# Patient Record
Sex: FEMALE | Race: WHITE | Employment: PART TIME
[De-identification: names, ages, dates, MRNs, and addresses within clinical notes are randomized per-mention and may not be internally consistent; named-entity substitution may affect disease eponyms.]

---

## 2022-03-18 PROBLEM — Z87.898 HISTORY OF MALIGNANT HYPERTHERMIA: Status: ACTIVE | Noted: 2017-12-27

## 2024-11-18 ENCOUNTER — HOSPITAL ENCOUNTER (OUTPATIENT)
Facility: HOSPITAL | Age: 44
Discharge: HOME | End: 2024-11-21
Attending: ORTHOPAEDIC SURGERY
Payer: MEDICAID

## 2024-11-18 DIAGNOSIS — M25.511 RIGHT SHOULDER PAIN, UNSPECIFIED CHRONICITY: ICD-10-CM

## 2024-11-18 PROCEDURE — 73222 MRI JOINT UPR EXTREM W/DYE: CPT

## 2024-11-18 PROCEDURE — 77002 NEEDLE LOCALIZATION BY XRAY: CPT

## 2024-11-18 PROCEDURE — A9577 INJ MULTIHANCE: HCPCS | Performed by: ORTHOPAEDIC SURGERY

## 2024-11-18 PROCEDURE — 6360000004 HC RX CONTRAST MEDICATION: Performed by: ORTHOPAEDIC SURGERY

## 2024-11-18 PROCEDURE — 2500000003 HC RX 250 WO HCPCS: Performed by: ORTHOPAEDIC SURGERY

## 2024-11-18 RX ORDER — IOPAMIDOL 612 MG/ML
50 INJECTION, SOLUTION INTRAVASCULAR
Status: COMPLETED | OUTPATIENT
Start: 2024-11-18 | End: 2024-11-18

## 2024-11-18 RX ORDER — LIDOCAINE HYDROCHLORIDE 10 MG/ML
10 INJECTION, SOLUTION EPIDURAL; INFILTRATION; INTRACAUDAL; PERINEURAL ONCE
Status: COMPLETED | OUTPATIENT
Start: 2024-11-18 | End: 2024-11-18

## 2024-11-18 RX ADMIN — LIDOCAINE HYDROCHLORIDE 10 ML: 10 INJECTION, SOLUTION EPIDURAL; INFILTRATION; INTRACAUDAL; PERINEURAL at 10:43

## 2024-11-18 RX ADMIN — GADOBENATE DIMEGLUMINE 5 ML: 529 INJECTION, SOLUTION INTRAVENOUS at 10:44

## 2024-11-18 RX ADMIN — IOPAMIDOL 50 ML: 612 INJECTION, SOLUTION INTRAVENOUS at 10:43

## 2025-03-21 ENCOUNTER — HOSPITAL ENCOUNTER (OUTPATIENT)
Facility: HOSPITAL | Age: 45
Setting detail: RECURRING SERIES
Discharge: HOME | End: 2025-03-24
Payer: MEDICAID

## 2025-03-21 PROCEDURE — 97161 PT EVAL LOW COMPLEX 20 MIN: CPT

## 2025-03-21 NOTE — PROGRESS NOTES
[facility]  [address] [place] [address]  Ph [phone]  Fx [phone]    Plan of Care/ Statement of Necessity for Physical Therapy Services      Patient name: Siomara Pat Start of Care: 3/21/2025   Referral source: Joselin Larose,* : 1980    Medical Diagnosis: Primary osteoarthritis, right shoulder [M19.011]  Right shoulder pain [M25.511]   Onset Date:25    Treatment Diagnosis: M25.511  RIGHT SHOULDER PAIN     Prior Hospitalization: see medical history Provider#: [id number]     Comorbidities: Other: alcohol use, arthritis, virgilio-danlos, tobacco use, bone spur (collar bone), R RCR, R tendon repair     Prior Level of Function: functionally independent, no AD, active lifestyle       The Plan of Care and following information is based on the information from the initial evaluation.  Assessment/ key information: 45 yo female who [facility] to [facility] with c/o Shoulder pain. Patient is s/p R total shoulder replacement on 25. Patient reports that she has virgilio-danlos and doesn't think that mobility will be an issue. She states that her pain ranges from 0/10 - 10/10 and eases with ice and worsens with movement. Patient displays weakness with L shoulder flex/ER (R not tested due to time since surgery and protocol. Her AROM is WFL on the L and she is progressing on the R. Patient is a  and currently unable to work due to her shoulder; she will benefit from skilled therapy to increase strength and ROM in order to meet goals, decrease pain and return to PLOF. Patient demonstrates decreased ROM, decreased strength, impaired posture, pain and decreased functional mobility tolerance.   Evaluation Complexity HistoryHIGH Complexity :3+ comorbidities / personal factors will impact the outcome/ POC  ; Examination HIGH Complexity : 4+ Standardized tests and measures addressing body structure, function, activity limitation and / or participation in 
x weekly - 2 sets - 10 reps  - Seated Gripping Towel  - 1 x daily - 7 x weekly - 2 sets - 10 reps - 2-3\" hold  - Supported Elbow Flexion Extension AROM  - 1 x daily - 7 x weekly - 2 sets - 10 reps  - Seated Scapular Retraction  - 1 x daily - 7 x weekly - 1 sets - 10 reps - 10\" hold  - Seated Forearm Pronation Supination AROM  - 1 x daily - 7 x weekly - 2 sets - 10 reps  - Supine Shoulder Flexion AAROM  - 1 x daily - 7 x weekly - 1 sets - 10 reps - 10\" hold  [] Other detail:       General Evaluation    ROM:                             AROM   PROM   Shoulder Left Right Right   Flexion WNL NT 97   Extension NT NT NT   ABD WNL NT 60   ER T3 NT NT   IR T3 NT NT       Strength (MMT):  Shoulder Left (1-5) Right (1-5)   Shoulder Flexion 4+ NT   Shoulder Ext NT NT   Shoulder ABD 4 NT   Shoulder ADD NT NT   Shoulder IR 5 NT   Shoulder ER 3+ NT                                             Pain Level (0-10 scale) post treatment: 0/10    ASSESSMENT/Changes in Function: 45 yo female who [facility] to [facility] with c/o Shoulder pain. Patient is s/p R total shoulder replacement on 2/21/25. Patient reports that she has virgilio-danlos and doesn't think that mobility will be an issue. She states that her pain ranges from 0/10 - 10/10 and eases with ice and worsens with movement. Patient displays weakness with L shoulder flex/ER (R not tested due to time since surgery and protocol. Her AROM is WFL on the L and she is progressing on the R. Patient is a  and currently unable to work due to her shoulder; she will benefit from skilled therapy to increase strength and ROM in order to meet goals, decrease pain and return to PLOF. Patient demonstrates decreased ROM, decreased strength, impaired posture, pain and decreased functional mobility tolerance.    Patient will continue to benefit from skilled PT services to modify and progress therapeutic interventions, address functional mobility deficits, address ROM deficits, address

## 2025-03-25 ENCOUNTER — HOSPITAL ENCOUNTER (OUTPATIENT)
Facility: HOSPITAL | Age: 45
Setting detail: RECURRING SERIES
Discharge: HOME | End: 2025-03-28
Payer: MEDICAID

## 2025-03-25 PROCEDURE — 97110 THERAPEUTIC EXERCISES: CPT

## 2025-03-25 PROCEDURE — 97530 THERAPEUTIC ACTIVITIES: CPT

## 2025-03-25 NOTE — PROGRESS NOTES
PHYSICAL / OCCUPATIONAL THERAPY - DAILY TREATMENT NOTE     Patient Name: Siomara Pat    Date: 3/25/2025    : 1980  Insurance: Payor: [facility] MEDICAID / Plan: GOKUL [facility] HE[employer]KEEPERS PLUS / Product Type: *No Product type* /      Patient  verified Yes     Visit #   Current / Total 2 8   Time   In / Out 9:55 10:33   Pain   In / Out 1-2 2   Subjective Functional Status/Changes: Patient states this is her 7th right shoulder surgery, but first replacement so she's trying to get used to how this one feels compared to her other surgeries.   Changes to:  Allergies, Med Hx, Sx Hx?   no       TREATMENT AREA =  Primary osteoarthritis, right shoulder [M19.011]  Right shoulder pain [M25.511]    If an interpreting service is utilized for treatment of this patient, the contents of this document represent the material reviewed with the patient via the .     OBJECTIVE    Modalities Rationale:     offered but patient declined stating she doesn't like the vaso machine and she would ice at home    Therapeutic Procedures:  Tx Min Billable or 1:1 Min (if diff from Tx Min) Procedure, Rationale, Specifics   22  51146 Therapeutic Exercise (timed):  increase ROM, strength, coordination, balance, and proprioception to improve patient's ability to progress to PLOF and address remaining functional goals. (see flow sheet as applicable)    Details if applicable:       8  82068 Therapeutic Activity (timed):  use of dynamic activities replicating functional movements to increase ROM, strength, coordination, balance, and proprioception in order to improve patient's ability to progress to PLOF and address remaining functional goals.  (see flow sheet as applicable)   8  48262 Self Care/Home Management (timed):  improve patient knowledge and understanding of home injury/symptom/pain management, positioning, posture/ergonomics, and education regarding allowing right UE to swing during ambulation to promote increased ROM

## 2025-03-26 ENCOUNTER — TELEPHONE (OUTPATIENT)
Facility: HOSPITAL | Age: 45
End: 2025-03-26

## 2025-03-27 ENCOUNTER — HOSPITAL ENCOUNTER (OUTPATIENT)
Facility: HOSPITAL | Age: 45
Setting detail: RECURRING SERIES
Discharge: HOME | End: 2025-03-30
Payer: MEDICAID

## 2025-03-27 PROCEDURE — 97110 THERAPEUTIC EXERCISES: CPT

## 2025-03-27 PROCEDURE — 97530 THERAPEUTIC ACTIVITIES: CPT

## 2025-03-27 PROCEDURE — 97112 NEUROMUSCULAR REEDUCATION: CPT

## 2025-03-27 NOTE — PROGRESS NOTES
PHYSICAL / OCCUPATIONAL THERAPY - DAILY TREATMENT NOTE     Patient Name: Siomara Pat    Date: 3/27/2025    : 1980  Insurance: Payor: [facility] MEDICAID / Plan: [employer]OKUL [facility] HE[employer]KEEPERS PLUS / Product Type: *No Product type* /      Patient  verified Yes     Visit #   Current / Total 3 8   Time   In / Out 9:50 10:34   Pain   In / Out 3/10 2-3/10   Subjective Functional Status/Changes: Pt states she is extra stiff today   Changes to:  Allergies, Med Hx, Sx Hx?   no       TREATMENT AREA =  Primary osteoarthritis, right shoulder [M19.011]  Right shoulder pain [M25.511]    If an interpreting service is utilized for treatment of this patient, the contents of this document represent the material reviewed with the patient via the .     OBJECTIVE        Therapeutic Procedures:  Tx Min Billable or 1:1 Min (if diff from Tx Min) Procedure, Rationale, Specifics   20  98327 Therapeutic Exercise (timed):  increase ROM, strength, coordination, balance, and proprioception to improve patient's ability to progress to PLOF and address remaining functional goals. (see flow sheet as applicable)    Details if applicable:       10  78473 Neuromuscular Re-Education (timed):  improve balance, coordination, kinesthetic sense, posture, core stability and proprioception to improve patient's ability to develop conscious control of individual muscles and awareness of position of extremities in order to progress to PLOF and address remaining functional goals. (see flow sheet as applicable)    Details if applicable:     14  78013 Therapeutic Activity (timed):  use of dynamic activities replicating functional movements to increase ROM, strength, coordination, balance, and proprioception in order to improve patient's ability to progress to PLOF and address remaining functional goals.  (see flow sheet as applicable)     Details if applicable:           Details if applicable:            Details if applicable:     44

## 2025-04-01 ENCOUNTER — HOSPITAL ENCOUNTER (OUTPATIENT)
Facility: HOSPITAL | Age: 45
Setting detail: RECURRING SERIES
Discharge: HOME | End: 2025-04-04
Payer: MEDICAID

## 2025-04-01 PROCEDURE — 97112 NEUROMUSCULAR REEDUCATION: CPT

## 2025-04-01 PROCEDURE — 97110 THERAPEUTIC EXERCISES: CPT

## 2025-04-01 PROCEDURE — 97530 THERAPEUTIC ACTIVITIES: CPT

## 2025-04-01 NOTE — PROGRESS NOTES
PHYSICAL / OCCUPATIONAL THERAPY - DAILY TREATMENT NOTE     Patient Name: Siomara Pat    Date: 2025    : 1980  Insurance: Payor: [facility] MEDICAID / Plan: [employer]OKUL [facility] HE[employer]KEEPERS PLUS / Product Type: *No Product type* /      Patient  verified Yes     Visit #   Current / Total 4 8   Time   In / Out 1:51 2:38   Pain   In / Out 3/10 2/10   Subjective Functional Status/Changes: Pt states she slept on her arm last night, causing increased pain.    Changes to:  Allergies, Med Hx, Sx Hx?   no       TREATMENT AREA =  Primary osteoarthritis, right shoulder [M19.011]  Right shoulder pain [M25.511]    If an interpreting service is utilized for treatment of this patient, the contents of this document represent the material reviewed with the patient via the .     OBJECTIVE        Therapeutic Procedures:  Tx Min Billable or 1:1 Min (if diff from Tx Min) Procedure, Rationale, Specifics   20  56320 Therapeutic Exercise (timed):  increase ROM, strength, coordination, balance, and proprioception to improve patient's ability to progress to PLOF and address remaining functional goals. (see flow sheet as applicable)    Details if applicable:       10  92635 Neuromuscular Re-Education (timed):  improve balance, coordination, kinesthetic sense, posture, core stability and proprioception to improve patient's ability to develop conscious control of individual muscles and awareness of position of extremities in order to progress to PLOF and address remaining functional goals. (see flow sheet as applicable)    Details if applicable:     17  79396 Therapeutic Activity (timed):  use of dynamic activities replicating functional movements to increase ROM, strength, coordination, balance, and proprioception in order to improve patient's ability to progress to PLOF and address remaining functional goals.  (see flow sheet as applicable)     Details if applicable:           Details if applicable:

## 2025-04-03 ENCOUNTER — HOSPITAL ENCOUNTER (OUTPATIENT)
Facility: HOSPITAL | Age: 45
Setting detail: RECURRING SERIES
Discharge: HOME | End: 2025-04-06
Payer: MEDICAID

## 2025-04-03 PROCEDURE — 97110 THERAPEUTIC EXERCISES: CPT

## 2025-04-03 PROCEDURE — 97535 SELF CARE MNGMENT TRAINING: CPT

## 2025-04-03 PROCEDURE — 97112 NEUROMUSCULAR REEDUCATION: CPT

## 2025-04-03 NOTE — PROGRESS NOTES
Care/Home Management (timed):  improve patient knowledge and understanding of home injury/symptom/pain management, positioning, and posture/ergonomics  to improve patient's ability to progress to PLOF and address remaining functional goals.  (see flow sheet as applicable)    Details if applicable:            Details if applicable:     42  [facility] Totals Reminder: bill using total billable min of TIMED therapeutic procedures (example: do not include dry needle or estim unattended, both untimed codes, in totals to left)  8-22 min = 1 unit; 23-37 min = 2 units; 38-52 min = 3 units; 53-67 min = 4 units; 68-82 min = 5 units   Total Total     TOTAL TREATMENT TIME:        42     Charge Capture    [x]  Patient Education billed concurrently with other procedures   [x] Review HEP    [] Progressed/Changed HEP, detail:    [] Other detail:       Objective Information/Functional Measures/Assessment    Pt arrived in clinic, reporting minor pain in the Right shoulder at this time. Pt continues to be limited with therex progression due to post surgical protocol but, pt does have 6 week follow-up with surgeon tomorrow and will be able to progress next visit. Pt continues to tolerate PROM stretching and basic forearm, wrist and hand therex with reports of mild fatigue but, not above tolerance. Pt was able to achieve 120 deg of PROM flexion and scaption at this time, currently limited to this range per surgeon's provided protocol. Pt declined modalities post tx. Pt will benefit from continued therapy to address unmet goals and to return to prior level of activity.       Patient will continue to benefit from skilled PT / OT services to modify and progress therapeutic interventions, analyze and address functional mobility deficits, analyze and address ROM deficits, analyze and address strength deficits, analyze and address soft tissue restrictions, and analyze and cue for proper movement patterns to address functional deficits and attain

## 2025-04-08 ENCOUNTER — HOSPITAL ENCOUNTER (OUTPATIENT)
Facility: HOSPITAL | Age: 45
Setting detail: RECURRING SERIES
Discharge: HOME | End: 2025-04-11
Payer: MEDICAID

## 2025-04-08 PROCEDURE — 97110 THERAPEUTIC EXERCISES: CPT

## 2025-04-08 PROCEDURE — 97530 THERAPEUTIC ACTIVITIES: CPT

## 2025-04-08 PROCEDURE — 97112 NEUROMUSCULAR REEDUCATION: CPT

## 2025-04-08 NOTE — PROGRESS NOTES
to return to goals of returning to work.  Eval Status: Strength (MMT):  Shoulder Left (1-5) Right (1-5)   Shoulder Flexion 4+ NT   Shoulder Ext NT NT   Shoulder ABD 4 NT   Shoulder ADD NT NT   Shoulder IR 5 NT   Shoulder ER 3+ NT   Current: Initiated AROM and AAROM exercises today as patient stated she was cleared by surgeon to start strengthening.  Patient was challenged with voiding recruitment her right upper trap anytime her right UE approached 80-90 degrees; however, patient is self-aware of this and is trying to avoid the compensation.  Right UE fatigue noted after 1x10 reps each of 3 way shoulder, so had her perform pendulums and bicep curls (meaning activities that are not overhead) in between the first and second set of 3 way shoulder.   4/8/2025, in progress      Patient will improve pain in R shoulder to 2/10 at worst to improve functional activity tolerance and restore prior level of function.  Eval Status: 10/10 at worst    PLAN  Yes  Continue plan of care  []  Upgrade activities as tolerated  []  Discharge due to :  []  Other:    Vero Soriano, CIELO    4/8/2025    9:13 AM    Future Appointments   Date Time Provider Department Center   4/8/2025  9:50 AM Vero Soriano, PT [facility]   4/10/2025 10:30 AM Vero Soriano, PT [facility]   4/14/2025 10:30 AM Vero Soriano, PT [facility]   4/16/2025 10:30 AM Neli Larsen PT [facility]

## 2025-04-10 ENCOUNTER — HOSPITAL ENCOUNTER (OUTPATIENT)
Facility: HOSPITAL | Age: 45
Setting detail: RECURRING SERIES
Discharge: HOME | End: 2025-04-13
Payer: MEDICAID

## 2025-04-10 PROCEDURE — 97112 NEUROMUSCULAR REEDUCATION: CPT

## 2025-04-10 PROCEDURE — 97530 THERAPEUTIC ACTIVITIES: CPT

## 2025-04-10 NOTE — PROGRESS NOTES
returning to work.  Eval Status: Strength (MMT):  Shoulder Left (1-5) Right (1-5)   Shoulder Flexion 4+ NT   Shoulder Ext NT NT   Shoulder ABD 4 NT   Shoulder ADD NT NT   Shoulder IR 5 NT   Shoulder ER 3+ NT   Current: Initiated AROM and AAROM exercises today as patient stated she was cleared by surgeon to start strengthening.  Patient was challenged with voiding recruitment her right upper trap anytime her right UE approached 80-90 degrees; however, patient is self-aware of this and is trying to avoid the compensation.  Right UE fatigue noted after 1x10 reps each of 3 way shoulder, so had her perform pendulums and bicep curls (meaning activities that are not overhead) in between the first and second set of 3 way shoulder.   4/8/2025, in progress      Patient will improve pain in R shoulder to 2/10 at worst to improve functional activity tolerance and restore prior level of function.  Eval Status: 10/10 at worst    PLAN  Yes  Continue plan of care  []  Upgrade activities as tolerated  []  Discharge due to :  []  Other:    Vero Soriano, PT    4/10/2025    9:13 AM    Future Appointments   Date Time Provider Department Center   4/14/2025 10:30 AM Vero Soriano, PT [facility]   4/16/2025 10:30 AM Neli Larsen, PT [facility]

## 2025-04-14 ENCOUNTER — HOSPITAL ENCOUNTER (OUTPATIENT)
Facility: HOSPITAL | Age: 45
Setting detail: RECURRING SERIES
Discharge: HOME | End: 2025-04-17
Payer: MEDICAID

## 2025-04-14 PROCEDURE — 97535 SELF CARE MNGMENT TRAINING: CPT

## 2025-04-14 PROCEDURE — 97110 THERAPEUTIC EXERCISES: CPT

## 2025-04-14 PROCEDURE — 97530 THERAPEUTIC ACTIVITIES: CPT

## 2025-04-14 PROCEDURE — 97112 NEUROMUSCULAR REEDUCATION: CPT

## 2025-04-14 NOTE — PROGRESS NOTES
PHYSICAL / OCCUPATIONAL THERAPY - DAILY TREATMENT NOTE     Patient Name: Siomara Pat    Date: 2025    : 1980  Insurance: Payor: [facility] MEDICAID / Plan: [employer]OKUL [facility] HEALTHKEEPERS PLUS / Product Type: *No Product type* /      Patient  verified Yes     Visit #   Current / Total 8 8   Time   In / Out 10:30 10:27   Pain   In / Out 0 0/10 \"workout soreness\"   Subjective Functional Status/Changes: Patient states she is very minimally using/moving her right UE at home because she doesn't want to cause pain.  This includes patient not doing any cleaning (even if at waist height), \"a little\" cleaning using the right arm, and \"a little\" cooking.   Changes to:  Allergies, Med Hx, Sx Hx?   no       TREATMENT AREA =  Primary osteoarthritis, right shoulder [M19.011]  Right shoulder pain [M25.511]    If an interpreting service is utilized for treatment of this patient, the contents of this document represent the material reviewed with the patient via the .     OBJECTIVE    Modalities Rationale:     patient declined    Therapeutic Procedures:  Tx Min Billable or 1:1 Min (if diff from Tx Min) Procedure, Rationale, Specifics   14  71563 Therapeutic Exercise (timed):  increase ROM, strength, coordination, balance, and proprioception to improve patient's ability to progress to PLOF and address remaining functional goals. (see flow sheet as applicable)    Details if applicable:       27  86652 Therapeutic Activity (timed):  use of dynamic activities replicating functional movements to increase ROM, strength, coordination, balance, and proprioception in order to improve patient's ability to progress to PLOF and address remaining functional goals.  (see flow sheet as applicable)    Details if applicable:     8  86699 Neuromuscular Re-Education (timed):  improve balance, coordination, kinesthetic sense, posture, core stability and proprioception to improve patient's ability to develop conscious control

## 2025-04-14 NOTE — PROGRESS NOTES
[facility]  [address] [place] [address]  Ph [phone]  Fx [phone]      Continued Plan of Care/ Re-certification for Physical Therapy Services    Patient name: Siomara Pat Start of Care: 3/21/2025   Referral source: Joselin Larose,* : 1980               Medical Diagnosis: Primary osteoarthritis, right shoulder [M19.011]  Right shoulder pain [M25.511]    Onset Date:25               Treatment Diagnosis: M25.511  RIGHT SHOULDER PAIN     Prior Hospitalization: see medical history Provider#: [id number]   Comorbidities: Other: alcohol use, arthritis, virgilio-danlos, tobacco use, bone spur (collar bone), R RCR, R tendon repair   Prior Level of Function: functionally independent, no AD, active lifestyle     Visits from Start of Care: 8    Missed Visits: 0    Reporting Period: 3/21/25 to 25    The Plan of Care and following information is based on the patient's current status:    Key functional changes: slow, but gradual progression with ROM, UE/scapular strength, and decreasing pain      Problems/ barriers to goal attainment: patient guarded to use of her right UE at home/clinic which is limiting her progress (though DPT talked with patient today about using her right UE as much as progress), continued deficits in ROM, UE/scapular strength, posture, and functional use     Problem List: pain affecting function, decrease ROM, decrease strength, edema affection function, decrease ADL/functional abilities, decrease activity tolerance, decrease flexibility/joint mobility, and decrease transfer abilities     Treatment Plan: Therapeutic exercise, Neuromuscular reeducation, Therapeutic activity, Self care/home management, Vasopneumatic device, Needle insertion w/o injection (1 or 2 muscles), and Needle insertion w/o injection (3+ muscles)     Goals for this certification period to be accomplished in 24 visits    Frequency / Duration: Patient to be seen 2

## 2025-04-16 ENCOUNTER — HOSPITAL ENCOUNTER (OUTPATIENT)
Facility: HOSPITAL | Age: 45
Setting detail: RECURRING SERIES
Discharge: HOME | End: 2025-04-19
Payer: MEDICAID

## 2025-04-16 PROCEDURE — 97535 SELF CARE MNGMENT TRAINING: CPT

## 2025-04-16 PROCEDURE — 97110 THERAPEUTIC EXERCISES: CPT

## 2025-04-16 PROCEDURE — 97112 NEUROMUSCULAR REEDUCATION: CPT

## 2025-04-16 PROCEDURE — 97530 THERAPEUTIC ACTIVITIES: CPT

## 2025-04-16 NOTE — PROGRESS NOTES
PHYSICAL / OCCUPATIONAL THERAPY - DAILY TREATMENT NOTE     Patient Name: Siomara Pat    Date: 2025    : 1980  Insurance: Payor: [facility] MEDICAID / Plan: [employer]OKUL [facility] HEALTHKEEPERS PLUS / Product Type: *No Product type* /      Patient  verified Yes     Visit #   Current / Total 9 16   Time   In / Out 1030 1123   Pain   In / Out 1/10 1/10   Subjective Functional Status/Changes: Pt reported that she is starting to use the arm more   Changes to:  Allergies, Med Hx, Sx Hx?   no       TREATMENT AREA =  Primary osteoarthritis, right shoulder [M19.011]  Right shoulder pain [M25.511]    If an interpreting service is utilized for treatment of this patient, the contents of this document represent the material reviewed with the patient via the .     OBJECTIVE    Therapeutic Procedures:  Tx Min Billable or 1:1 Min (if diff from Tx Min) Procedure, Rationale, Specifics    15  12186 Therapeutic Exercise (timed):  increase ROM, strength, coordination, balance, and proprioception to improve patient's ability to progress to PLOF and address remaining functional goals. (see flow sheet as applicable)    Details if applicable:       15  51712 Neuromuscular Re-Education (timed):  improve balance, coordination, kinesthetic sense, posture, core stability and proprioception to improve patient's ability to develop conscious control of individual muscles and awareness of position of extremities in order to progress to PLOF and address remaining functional goals. (see flow sheet as applicable)    Details if applicable:     15  04710 Therapeutic Activity (timed):  use of dynamic activities replicating functional movements to increase ROM, strength, coordination, balance, and proprioception in order to improve patient's ability to progress to PLOF and address remaining functional goals.  (see flow sheet as applicable)     Details if applicable:     8  00922 Self Care/Home Management (timed):  improve patient

## 2025-04-22 ENCOUNTER — HOSPITAL ENCOUNTER (OUTPATIENT)
Facility: HOSPITAL | Age: 45
Setting detail: RECURRING SERIES
Discharge: HOME | End: 2025-04-25
Payer: MEDICAID

## 2025-04-22 PROCEDURE — 97110 THERAPEUTIC EXERCISES: CPT

## 2025-04-22 PROCEDURE — 97530 THERAPEUTIC ACTIVITIES: CPT

## 2025-04-22 PROCEDURE — 97112 NEUROMUSCULAR REEDUCATION: CPT

## 2025-04-22 NOTE — PROGRESS NOTES
PHYSICAL / OCCUPATIONAL THERAPY - DAILY TREATMENT NOTE     Patient Name: Siomara Pat    Date: 2025    : 1980  Insurance: Payor: [facility] MEDICAID / Plan: [employer]OKUL [facility] HEALTHKEEPERS PLUS / Product Type: *No Product type* /      Patient  verified Yes     Visit #   Current / Total 10 16   Time   In / Out 11:11 11:50   Pain   In / Out 1 0   Subjective Functional Status/Changes: \"I've been using my arm more, but it does increase the soreness.\"   Changes to:  Allergies, Med Hx, Sx Hx?   no       TREATMENT AREA =  Primary osteoarthritis, right shoulder [M19.011]  Right shoulder pain [M25.511]    If an interpreting service is utilized for treatment of this patient, the contents of this document represent the material reviewed with the patient via the .     OBJECTIVE    Therapeutic Procedures:  Tx Min Billable or 1:1 Min (if diff from Tx Min) Procedure, Rationale, Specifics   15  29856 Therapeutic Exercise (timed):  increase ROM, strength, coordination, balance, and proprioception to improve patient's ability to progress to PLOF and address remaining functional goals. (see flow sheet as applicable)    Details if applicable:       16  74391 Therapeutic Activity (timed):  use of dynamic activities replicating functional movements to increase ROM, strength, coordination, balance, and proprioception in order to improve patient's ability to progress to PLOF and address remaining functional goals.  (see flow sheet as applicable)    Details if applicable:     15  17209 Neuromuscular Re-Education (timed):  improve balance, coordination, kinesthetic sense, posture, core stability and proprioception to improve patient's ability to develop conscious control of individual muscles and awareness of position of extremities in order to progress to PLOF and address remaining functional goals. (see flow sheet as applicable)     Details if applicable:     39   BC Totals Reminder: bill using total billable

## 2025-04-30 ENCOUNTER — HOSPITAL ENCOUNTER (OUTPATIENT)
Facility: HOSPITAL | Age: 45
Setting detail: RECURRING SERIES
Discharge: HOME | End: 2025-05-03
Payer: MEDICAID

## 2025-04-30 PROCEDURE — 97110 THERAPEUTIC EXERCISES: CPT

## 2025-04-30 PROCEDURE — 97530 THERAPEUTIC ACTIVITIES: CPT

## 2025-04-30 PROCEDURE — 97112 NEUROMUSCULAR REEDUCATION: CPT

## 2025-04-30 NOTE — PROGRESS NOTES
PHYSICAL / OCCUPATIONAL THERAPY - DAILY TREATMENT NOTE     Patient Name: Siomara Pat    Date: 2025    : 1980  Insurance: Payor: [facility] MEDICAID / Plan: [facility] HEALTHKEEPERS PLUS / Product Type: *No Product type* /      Patient  verified Yes     Visit #   Current / Total 11 16   Time   In / Out 12:33 1:12   Pain   In / Out 1/10 1/10   Subjective Functional Status/Changes: \"I don't have too much pain right now.\"   Changes to:  Allergies, Med Hx, Sx Hx?   no       TREATMENT AREA =  Primary osteoarthritis, right shoulder [M19.011]  Right shoulder pain [M25.511]    If an interpreting service is utilized for treatment of this patient, the contents of this document represent the material reviewed with the patient via the .     OBJECTIVE    Modalities Rationale:     decrease edema, decrease inflammation, and decrease pain to improve patient's ability to progress to PLOF and address remaining functional goals.     min [] Estim Unattended, type/location:                                      []  w/ice    []  w/heat    min [] Estim Attended, type/location:                                     []  w/US     []  w/ice    []  w/heat    []  TENS insruct      min []  Mechanical Traction: type/lbs                   []  pro   []  sup   []  int   []  cont    []  before manual    []  after manual    min []  Ultrasound, settings/location:      min []  Iontophoresis w/ dexamethasone, location:                                               []  take home patch       []  in clinic        min  unbilled []  Ice     []  Heat    location/position:     min []  Paraffin,  details:     min []  Vasopneumatic Device, press/temp:     min []  Whirlpool / Fluido:    If using vaso (only need to measure limb vaso being performed on)      pre-treatment girth :       post-treatment girth :       measured at (landmark location) :      min []  Other:    Skin assessment post-treatment (if applicable):    [x]  intact

## 2025-05-05 ENCOUNTER — HOSPITAL ENCOUNTER (OUTPATIENT)
Facility: HOSPITAL | Age: 45
Setting detail: RECURRING SERIES
Discharge: HOME | End: 2025-05-08
Payer: MEDICAID

## 2025-05-05 PROCEDURE — 97530 THERAPEUTIC ACTIVITIES: CPT

## 2025-05-05 PROCEDURE — 97110 THERAPEUTIC EXERCISES: CPT

## 2025-05-05 PROCEDURE — 97112 NEUROMUSCULAR REEDUCATION: CPT

## 2025-05-05 NOTE — PROGRESS NOTES
PHYSICAL / OCCUPATIONAL THERAPY - DAILY TREATMENT NOTE     Patient Name: Siomara Pat    Date: 2025    : 1980  Insurance: Payor: [facility] MEDICAID / Plan: [employer] [facility] HEALTHKEEPERS PLUS / Product Type: *No Product type* /      Patient  verified Yes     Visit #   Current / Total 12 16   Time   In / Out 12:30 1:12   Pain   In / Out 0 shoulders, 2-3 right wrist 0 shoulders, 2-3 right wrist   Subjective Functional Status/Changes: Patient states her son had barricaded himself in his room this weekend, so patient stated she was trying to get into his room by ramming her contralateral shoulder into it, but when she did the door opened and she had to quickly grab the doorjam her surgical side hand.  \" I felt a pop in my (right) shoulder, but it wasn't painful so I think it might have just broken up some scar tissue.\"  No shoulder pain today, but patient states she's been having mild pain in her right wrist since the door incident.   Changes to:  Allergies, Med Hx, Sx Hx?   no       TREATMENT AREA =  Primary osteoarthritis, right shoulder [M19.011]  Right shoulder pain [M25.511]    If an interpreting service is utilized for treatment of this patient, the contents of this document represent the material reviewed with the patient via the .     OBJECTIVE    Therapeutic Procedures:  Tx Min Billable or 1:1 Min (if diff from Tx Min) Procedure, Rationale, Specifics   12  69494 Therapeutic Exercise (timed):  increase ROM, strength, coordination, balance, and proprioception to improve patient's ability to progress to PLOF and address remaining functional goals. (see flow sheet as applicable)    Details if applicable:       18  99589 Therapeutic Activity (timed):  use of dynamic activities replicating functional movements to increase ROM, strength, coordination, balance, and proprioception in order to improve patient's ability to progress to PLOF and address remaining functional goals.  (see flow

## 2025-05-07 ENCOUNTER — HOSPITAL ENCOUNTER (EMERGENCY)
Facility: HOSPITAL | Age: 45
Discharge: HOME | End: 2025-05-07
Attending: EMERGENCY MEDICINE
Payer: MEDICAID

## 2025-05-07 ENCOUNTER — APPOINTMENT (OUTPATIENT)
Facility: HOSPITAL | Age: 45
End: 2025-05-07
Payer: MEDICAID

## 2025-05-07 VITALS
SYSTOLIC BLOOD PRESSURE: 128 MMHG | HEART RATE: 95 BPM | HEIGHT: 68 IN | RESPIRATION RATE: 12 BRPM | WEIGHT: 220 LBS | DIASTOLIC BLOOD PRESSURE: 82 MMHG | BODY MASS INDEX: 33.34 KG/M2 | TEMPERATURE: 98.4 F | OXYGEN SATURATION: 99 %

## 2025-05-07 DIAGNOSIS — R10.10 PAIN OF UPPER ABDOMEN: Primary | ICD-10-CM

## 2025-05-07 LAB
ALBUMIN SERPL-MCNC: 3.5 G/DL (ref 3.4–5)
ALBUMIN/GLOB SERPL: 1.1 (ref 0.8–1.7)
ALP SERPL-CCNC: 92 U/L (ref 45–117)
ALT SERPL-CCNC: 33 U/L (ref 10–35)
ANION GAP SERPL CALC-SCNC: 11 MMOL/L (ref 3–18)
APPEARANCE UR: ABNORMAL
AST SERPL-CCNC: 30 U/L (ref 10–38)
BACTERIA URNS QL MICRO: ABNORMAL /HPF
BASOPHILS # BLD: 0.07 K/UL (ref 0–0.1)
BASOPHILS NFR BLD: 0.7 % (ref 0–2)
BILIRUB SERPL-MCNC: 0.4 MG/DL (ref 0.2–1)
BILIRUB UR QL: NEGATIVE
BUN SERPL-MCNC: 8 MG/DL (ref 6–23)
BUN/CREAT SERPL: 12 (ref 12–20)
CALCIUM SERPL-MCNC: 9.8 MG/DL (ref 8.5–10.1)
CHLORIDE SERPL-SCNC: 104 MMOL/L (ref 98–107)
CO2 SERPL-SCNC: 23 MMOL/L (ref 21–32)
COLOR UR: ABNORMAL
CREAT SERPL-MCNC: 0.69 MG/DL (ref 0.6–1.3)
DIFFERENTIAL METHOD BLD: ABNORMAL
EOSINOPHIL # BLD: 0.24 K/UL (ref 0–0.4)
EOSINOPHIL NFR BLD: 2.5 % (ref 0–5)
EPITH CASTS URNS QL MICRO: ABNORMAL /LPF (ref 0–5)
ERYTHROCYTE [DISTWIDTH] IN BLOOD BY AUTOMATED COUNT: 12.2 % (ref 11.6–14.5)
GLOBULIN SER CALC-MCNC: 3.1 G/DL (ref 2–4)
GLUCOSE SERPL-MCNC: 115 MG/DL (ref 74–108)
GLUCOSE UR STRIP.AUTO-MCNC: NEGATIVE MG/DL
HCG UR QL: NEGATIVE
HCT VFR BLD AUTO: 46.4 % (ref 35–45)
HGB BLD-MCNC: 15.7 G/DL (ref 12–16)
HGB UR QL STRIP: NEGATIVE
IMM GRANULOCYTES # BLD AUTO: 0.04 K/UL (ref 0–0.04)
IMM GRANULOCYTES NFR BLD AUTO: 0.4 % (ref 0–0.5)
KETONES UR QL STRIP.AUTO: ABNORMAL MG/DL
LEUKOCYTE ESTERASE UR QL STRIP.AUTO: ABNORMAL
LIPASE SERPL-CCNC: 36 U/L (ref 13–75)
LYMPHOCYTES # BLD: 2.87 K/UL (ref 0.9–3.3)
LYMPHOCYTES NFR BLD: 30.4 % (ref 21–52)
MCH RBC QN AUTO: 32.8 PG (ref 24–34)
MCHC RBC AUTO-ENTMCNC: 33.8 G/DL (ref 31–37)
MCV RBC AUTO: 96.9 FL (ref 78–100)
MONOCYTES # BLD: 0.84 K/UL (ref 0.05–1.2)
MONOCYTES NFR BLD: 8.9 % (ref 3–10)
NEUTS SEG # BLD: 5.37 K/UL (ref 1.8–8)
NEUTS SEG NFR BLD: 57.1 % (ref 40–73)
NITRITE UR QL STRIP.AUTO: NEGATIVE
NRBC # BLD: 0 K/UL (ref 0–0.01)
NRBC BLD-RTO: 0 PER 100 WBC
PH UR STRIP: 5.5 (ref 5–8)
PLATELET # BLD AUTO: 316 K/UL (ref 135–420)
PMV BLD AUTO: 9.3 FL (ref 9.2–11.8)
POTASSIUM SERPL-SCNC: 4.3 MMOL/L (ref 3.5–5.5)
PROT SERPL-MCNC: 6.6 G/DL (ref 6.4–8.2)
PROT UR STRIP-MCNC: ABNORMAL MG/DL
RBC # BLD AUTO: 4.79 M/UL (ref 4.2–5.3)
RBC #/AREA URNS HPF: NEGATIVE /HPF (ref 0–5)
SODIUM SERPL-SCNC: 137 MMOL/L (ref 136–145)
SP GR UR REFRACTOMETRY: 1.02 (ref 1–1.03)
UROBILINOGEN UR QL STRIP.AUTO: 1 EU/DL (ref 0.2–1)
WBC # BLD AUTO: 9.4 K/UL (ref 4.6–13.2)
WBC URNS QL MICRO: ABNORMAL /HPF (ref 0–5)

## 2025-05-07 PROCEDURE — 96375 TX/PRO/DX INJ NEW DRUG ADDON: CPT

## 2025-05-07 PROCEDURE — 2580000003 HC RX 258: Performed by: EMERGENCY MEDICINE

## 2025-05-07 PROCEDURE — 80053 COMPREHEN METABOLIC PANEL: CPT

## 2025-05-07 PROCEDURE — 81025 URINE PREGNANCY TEST: CPT

## 2025-05-07 PROCEDURE — 96374 THER/PROPH/DIAG INJ IV PUSH: CPT

## 2025-05-07 PROCEDURE — 74177 CT ABD & PELVIS W/CONTRAST: CPT

## 2025-05-07 PROCEDURE — 83690 ASSAY OF LIPASE: CPT

## 2025-05-07 PROCEDURE — 99285 EMERGENCY DEPT VISIT HI MDM: CPT

## 2025-05-07 PROCEDURE — 6360000002 HC RX W HCPCS: Performed by: EMERGENCY MEDICINE

## 2025-05-07 PROCEDURE — 81001 URINALYSIS AUTO W/SCOPE: CPT

## 2025-05-07 PROCEDURE — 6360000004 HC RX CONTRAST MEDICATION: Performed by: EMERGENCY MEDICINE

## 2025-05-07 PROCEDURE — 85025 COMPLETE CBC W/AUTO DIFF WBC: CPT

## 2025-05-07 RX ORDER — DICYCLOMINE HYDROCHLORIDE 10 MG/1
10 CAPSULE ORAL EVERY 8 HOURS PRN
Qty: 120 CAPSULE | Refills: 0 | Status: SHIPPED | OUTPATIENT
Start: 2025-05-07

## 2025-05-07 RX ORDER — ONDANSETRON 2 MG/ML
4 INJECTION INTRAMUSCULAR; INTRAVENOUS
Status: COMPLETED | OUTPATIENT
Start: 2025-05-07 | End: 2025-05-07

## 2025-05-07 RX ORDER — ONDANSETRON 4 MG/1
4 TABLET, ORALLY DISINTEGRATING ORAL 3 TIMES DAILY PRN
Qty: 10 TABLET | Refills: 0 | Status: SHIPPED | OUTPATIENT
Start: 2025-05-07

## 2025-05-07 RX ORDER — 0.9 % SODIUM CHLORIDE 0.9 %
500 INTRAVENOUS SOLUTION INTRAVENOUS ONCE
Status: COMPLETED | OUTPATIENT
Start: 2025-05-07 | End: 2025-05-07

## 2025-05-07 RX ORDER — OMEPRAZOLE 40 MG/1
40 CAPSULE, DELAYED RELEASE ORAL
Qty: 15 CAPSULE | Refills: 5 | Status: SHIPPED | OUTPATIENT
Start: 2025-05-07

## 2025-05-07 RX ORDER — IOPAMIDOL 755 MG/ML
100 INJECTION, SOLUTION INTRAVASCULAR
Status: COMPLETED | OUTPATIENT
Start: 2025-05-07 | End: 2025-05-07

## 2025-05-07 RX ORDER — MORPHINE SULFATE 4 MG/ML
4 INJECTION, SOLUTION INTRAMUSCULAR; INTRAVENOUS
Status: COMPLETED | OUTPATIENT
Start: 2025-05-07 | End: 2025-05-07

## 2025-05-07 RX ADMIN — MORPHINE SULFATE 4 MG: 4 INJECTION, SOLUTION INTRAMUSCULAR; INTRAVENOUS at 13:57

## 2025-05-07 RX ADMIN — ONDANSETRON 4 MG: 2 INJECTION, SOLUTION INTRAMUSCULAR; INTRAVENOUS at 13:54

## 2025-05-07 RX ADMIN — IOPAMIDOL 100 ML: 755 INJECTION, SOLUTION INTRAVENOUS at 15:49

## 2025-05-07 RX ADMIN — SODIUM CHLORIDE 500 ML: 0.9 INJECTION, SOLUTION INTRAVENOUS at 13:56

## 2025-05-07 ASSESSMENT — PAIN DESCRIPTION - FREQUENCY: FREQUENCY: INTERMITTENT

## 2025-05-07 ASSESSMENT — PAIN DESCRIPTION - PAIN TYPE: TYPE: ACUTE PAIN

## 2025-05-07 ASSESSMENT — PAIN - FUNCTIONAL ASSESSMENT: PAIN_FUNCTIONAL_ASSESSMENT: 0-10

## 2025-05-07 ASSESSMENT — PAIN DESCRIPTION - DESCRIPTORS: DESCRIPTORS: ACHING

## 2025-05-07 ASSESSMENT — PAIN DESCRIPTION - LOCATION: LOCATION: ABDOMEN;BACK

## 2025-05-07 ASSESSMENT — PAIN DESCRIPTION - ORIENTATION: ORIENTATION: MID;RIGHT;LEFT

## 2025-05-07 ASSESSMENT — PAIN SCALES - GENERAL: PAINLEVEL_OUTOF10: 8

## 2025-05-07 NOTE — ED PROVIDER NOTES
[facility]  EMERGENCY DEPARTMENT ENCOUNTER       Pt Name: Siomara Pat  MRN: [id number]  Birthdate 1980  Date of evaluation: 5/7/2025  Provider: Nicole Phelan MD   PCP: Brent Sharp MD  Note Started: 8:51 PM 5/7/25     CHIEF COMPLAINT       Chief Complaint   Patient presents with    Abdominal Pain    Back Pain        HISTORY OF PRESENT ILLNESS: 1 or more elements      History From: Patient  History limited by: Nothing     Siomara Pat is a 45 y.o. female who presents to ED complaining of abdomen pain since about an hour prior to ED arrival.  Pain is sharp constant it is like a tight belt around upper abdomen.  It is worse on the right side.  Patient reports was trying to reach out for something when pain started.  She denies nausea, vomiting, diarrhea or constipation.  She denies chest pain.  She rates the pain 10 out of 10.     Nursing Notes were all reviewed and agreed with or any disagreements were addressed in the HPI.     REVIEW OF SYSTEMS      Review of Systems     Positives and Pertinent negatives as per HPI.    PAST HISTORY     Past Medical History:  Past Medical History:   Diagnosis Date    Complication of anesthesia     dont wake up easily with elevated temp    Malignant hyperthermia due to anesthesia 1995    [facility]       Past Surgical History:  Past Surgical History:   Procedure Laterality Date    KNEE ARTHROSCOPY Bilateral     SHOULDER ARTHROSCOPY Right     x 5    TONSILLECTOMY  2001       Family History:  Family History   Problem Relation Age of Onset    Asthma Father        Social History:  Social History     Tobacco Use    Smoking status: Every Day     Current packs/day: 0.50     Types: Cigarettes    Smokeless tobacco: Never   Substance Use Topics    Alcohol use: Not Currently     Comment: stats every couple of days she drinks a beer or two    Drug use: No       Allergies:  Allergies   Allergen Reactions    Adhesive Tape Itching    Shellfish

## 2025-05-07 NOTE — ED TRIAGE NOTES
Patient reports she reached for something and she is having pain from abdomen around back and back to front at level of ribs.

## 2025-05-13 ENCOUNTER — HOSPITAL ENCOUNTER (OUTPATIENT)
Facility: HOSPITAL | Age: 45
Setting detail: RECURRING SERIES
Discharge: HOME | End: 2025-05-16
Payer: MEDICAID

## 2025-05-13 PROCEDURE — 97110 THERAPEUTIC EXERCISES: CPT

## 2025-05-13 PROCEDURE — 97112 NEUROMUSCULAR REEDUCATION: CPT

## 2025-05-13 PROCEDURE — 97530 THERAPEUTIC ACTIVITIES: CPT

## 2025-05-13 NOTE — PROGRESS NOTES
[facility]  [address] [place] [address]  Ph [phone]  Fx [phone]    Physical Therapy Progress Note  Patient name: Siomara Pat Start of Care: 3/21/2025   Referral source: Joselin Larose,* : 1980   Medical/Treatment Diagnosis: Primary osteoarthritis, right shoulder  Right shoulder pain Onset Date:2025   Prior Hospitalization: see medical history Provider#: [id number]   Comorbidities:  Other: alcohol use, arthritis, virgilio-danlos, tobacco use, bone spur (collar bone), R RCR, R tendon repair   Prior Level of Function: functionally independent, no AD, active lifestyle     Reporting Period: 25-25    Visits from Start of Care: 13    Missed Visits: 0    Updated Goals/Measure of Progress: To be achieved in  :    Short Term Goals: To be accomplished in 8 treatments:  Patient will be independent and compliant with HEP to progress toward goals and restore functional mobility.   Eval Status: issued at eval  PN 25: Patient reports continued compliance with their HEP.   2025, CONTINUOUSLY IN PROGRESS               PN 2025: Pt reports daily compliance with HEP   Progressing     Patient will score 60% or lower on QuickDASH to meet MCID and show improvement with functional activities and ADL's.              Scoring:           MCID = 19%                                        41-60% = moderate difficulty                                         0-40% = mild to no difficulty                > 61% = severe difficulty              Eval: 79.55% on QuickDASH              25: 34.09%   MET     Patient will improve pain in R shoulder to 4/10  to improve functional activity tolerance and restore prior level of function.  Eval Status: 10/10 at worst  PN 2025: 3/10 at worst, 0-1/10 average daily pain   MET     Pt will have 4/5 B UE strength to return to goals of cooking for family.  Eval Status: Strength (MMT):  Shoulder Left (1-5) Right (1-5) 
0-40% = mild to no difficulty                > 61% = severe difficulty              Eval: 79.55% on QuickDASH              5/5/25: 34.09%   MET     Patient will improve pain in R shoulder to 4/10  to improve functional activity tolerance and restore prior level of function.  Eval Status: 10/10 at worst  PN 4/14/2025: 3/10 at worst, 0-1/10 average daily pain   MET     Pt will have 4/5 B UE strength to return to goals of cooking for family.  Eval Status: Strength (MMT):  Shoulder Left (1-5) Right (1-5) Left 5/13/25 Right 5/13/25   Shoulder Flexion 4+ NT 5 4-   Shoulder Ext NT NT     Shoulder ABD 4 NT 5 4-   Shoulder ADD NT NT     Shoulder IR 5 NT 5 5   Shoulder ER 3+ NT 5 4   PN 4/14/2025: Right Shoulder MMT within available range: flexion 4-/5, abduction 4-/5, biceps 5/5, ER/IR 4+/5   SLOWLY PROGRESSING  .PN 5/13/2025: see above   Progressing     Pt will improve R shoulder PROM to 120 degrees or greater to aid in functional mechanics for ADLs.  Eval Status:    ROM:                  AROM                       PROM   Shoulder Left Right Right   Flexion WNL NT 97   Extension NT NT  NT   ABD WNL NT 60   ER T3 NT  NT   IR T3 NT  NT   Current: Right Shoulder PROM: Flexion: 120* deg, Scaption: 120* deg MET        Long Term Goals: To be accomplished in 24 treatments:  Patient will score 40% or lower on QuickDASH to meet MCID and show improvement with functional activities and ADL's.              Scoring:           MCID = 19%                                        41-60% = moderate difficulty                                          0-40% = mild to no difficulty                > 61% = severe difficulty               Eval: 79.55% on QuickDASH               5/5/25: 34.09%   MET     Pt will have painfree R shoulder AROM WFL to aid in functional mechanics for ADLs.  Eval Status: 10/10 at worst  PN 4/14/2025: Right UE AROM (in degs): flexion 88, scaption 80, abduction 68; pain with all 3 but not within shoulder joint (patient

## 2025-05-19 ENCOUNTER — TELEPHONE (OUTPATIENT)
Facility: HOSPITAL | Age: 45
End: 2025-05-19

## 2025-05-19 NOTE — TELEPHONE ENCOUNTER
Called & let pt know that we are cxl tomorrow's appt due to no auth, pt is aware of next appt date/time if auth is back by then

## 2025-05-20 ENCOUNTER — APPOINTMENT (OUTPATIENT)
Facility: HOSPITAL | Age: 45
End: 2025-05-20
Payer: MEDICAID

## 2025-05-27 ENCOUNTER — TELEPHONE (OUTPATIENT)
Facility: HOSPITAL | Age: 45
End: 2025-05-27

## 2025-05-27 NOTE — TELEPHONE ENCOUNTER
Pt called to check on auth status, auth still pending, pt said she'd call back tomorrow to check on it I also informed pt we'd call her, pt let me know that she lives across the street so no need to cxl before 1p

## 2025-05-28 ENCOUNTER — HOSPITAL ENCOUNTER (OUTPATIENT)
Facility: HOSPITAL | Age: 45
Setting detail: RECURRING SERIES
End: 2025-05-28
Payer: MEDICAID

## 2025-05-28 NOTE — PROGRESS NOTES
PHYSICAL / OCCUPATIONAL THERAPY - DAILY TREATMENT NOTE     Patient Name: Siomara Pat    Date: 2025    : 1980  Insurance: Payor: [facility] MEDICAID / Plan: [facility] HEALTHKEEPERS PLUS / Product Type: *No Product type* /      Patient  verified Yes     Visit #   Current / Total 14 16   Time   In / Out *** ***   Pain   In / Out *** ***   Subjective Functional Status/Changes: ***   Changes to:  Allergies, Med Hx, Sx Hx?   no       TREATMENT AREA =  Primary osteoarthritis, right shoulder [M19.011]  Right shoulder pain [M25.511]    If an interpreting service is utilized for treatment of this patient, the contents of this document represent the material reviewed with the patient via the .     OBJECTIVE    Modalities Rationale:     decrease edema, decrease inflammation, and decrease pain to improve patient's ability to progress to PLOF and address remaining functional goals.     min [] Estim Unattended, type/location:                                      []  w/ice    []  w/heat    min [] Estim Attended, type/location:                                     []  w/US     []  w/ice    []  w/heat    []  TENS insruct      min []  Mechanical Traction: type/lbs                   []  pro   []  sup   []  int   []  cont    []  before manual    []  after manual    min []  Ultrasound, settings/location:      min []  Iontophoresis w/ dexamethasone, location:                                               []  take home patch       []  in clinic        min  unbilled []  Ice     []  Heat    location/position:     min []  Paraffin,  details:     min []  Vasopneumatic Device, press/temp:     min []  Whirlpool / Fluido:    If using vaso (only need to measure limb vaso being performed on)      pre-treatment girth :       post-treatment girth :       measured at (landmark location) :      min []  Other:    Skin assessment post-treatment (if applicable):    [x]  intact    []  redness- no adverse reaction

## 2025-05-29 ENCOUNTER — HOSPITAL ENCOUNTER (OUTPATIENT)
Facility: HOSPITAL | Age: 45
Setting detail: RECURRING SERIES
End: 2025-05-29
Payer: MEDICAID

## 2025-05-29 PROCEDURE — 97112 NEUROMUSCULAR REEDUCATION: CPT

## 2025-05-29 PROCEDURE — 97530 THERAPEUTIC ACTIVITIES: CPT

## 2025-05-29 PROCEDURE — 97110 THERAPEUTIC EXERCISES: CPT

## 2025-05-29 NOTE — PROGRESS NOTES
[facility]  [address] [place], [place] [address]  Phone [phone]  Fax [phone]    Physical Therapy Discharge Summary    Patient name: Siomara Pat Start of Care: 3/21/2025   Referral source: Joselin Larose,* : 1980   Medical/Treatment Diagnosis: Primary osteoarthritis, right shoulder  Right shoulder pain Onset Date:2025   Prior Hospitalization: see medical history Provider#: [id number]   Comorbidities:  Other: alcohol use, arthritis, virgilio-danlos, tobacco use, bone spur (collar bone), R RCR, R tendon repair   Prior Level of Function: functionally independent, no AD, active lifestyle    Visits from Start of Care: 14    Missed Visits: 0    Reporting Period : 3/21/25 to 25    Assessment / Summary of Care:  Patient has been absent from skilled PT for the last two weeks while awaiting insurance authorization; however, patient returned to therapy today with reports of no pain or dysfunction during all of her normal daily functional activities.  Patient has regained functional AROM and strength of her right shoulder, which has helped to improve the ability to use her right UE during everyday activities.  All goals have been fully met, with the exception of patient still having very mild weakness during right shoulder flexion testing (4+/5).  Albeit, patient is independent with her HEP and is knowledgeable on how to address her remaining deficits with home exercises.  Patient is very pleased with how her shoulder is doing and agrees with D/C to HEP.  Thank you for the referral to [facility].    Short Term Goals: To be accomplished in 8 treatments:  Patient will be independent and compliant with HEP to progress toward goals and restore functional mobility.   Eval Status: issued at eval  PN 2025: Patient reports daily compliance with HEP.   MET      Patient will score 60% or lower on QuickDASH to meet MCID and show improvement with

## 2025-05-29 NOTE — PROGRESS NOTES
PHYSICAL / OCCUPATIONAL THERAPY - DAILY TREATMENT NOTE     Patient Name: Siomara Pat    Date: 2025    : 1980  Insurance: Payor: [facility] MEDICAID / [facility] / Product Type: *No Product type* /      Patient  verified Yes     Visit #   Current / Total 14 16   Time   In / Out 12:31 1:12   Pain   In / Out 0 0   Subjective Functional Status/Changes: Patient states she hasn't been having any pain during all functional activities, except for rainy days.  Patient states she is back to performing all of her normal daily functional activities without pain or difficulty.   Changes to:  Allergies, Med Hx, Sx Hx?   no       TREATMENT AREA =  Primary osteoarthritis, right shoulder [M19.011]  Right shoulder pain [M25.511]    If an interpreting service is utilized for treatment of this patient, the contents of this document represent the material reviewed with the patient via the .     OBJECTIVE    Therapeutic Procedures:  Tx Min Billable or 1:1 Min (if diff from Tx Min) Procedure, Rationale, Specifics   13  08048 Therapeutic Exercise (timed):  increase ROM, strength, coordination, balance, and proprioception to improve patient's ability to progress to PLOF and address remaining functional goals. (see flow sheet as applicable)    Details if applicable:       14  09302 Therapeutic Activity (timed):  use of dynamic activities replicating functional movements to increase ROM, strength, coordination, balance, and proprioception in order to improve patient's ability to progress to PLOF and address remaining functional goals.  (see flow sheet as applicable)    Details if applicable:     14  80390 Neuromuscular Re-Education (timed):  improve balance, coordination, kinesthetic sense, posture, core stability and proprioception to improve patient's ability to develop conscious control of individual muscles and awareness of position of extremities in order to progress to PLOF

## 2025-05-29 NOTE — PROGRESS NOTES
Physical Therapy Discharge Instructions    [facility] at [facility]  [address] [place] [address]  Ph [phone]  Fx [phone]      Patient: Siomara Pat  : 1980      Continue Home Exercise Program 1 times per day for 4-6 weeks, then decrease to 3 times per week      Continue with    [x] Ice  as needed     [x] Heat           Follow up with MD:     [] Upon completion of therapy     [x] As needed      Recommendations:     [x]   Return to activity with home program    []   Return to activity with the following modifications:       []Post Rehab Program    []Join Independent aquatic program     []Return to/join local gym      Additional Comments: Thank you for choosing [facility]!    Vero Soriano, PT 2025 6:58 PM

## 2025-07-14 ENCOUNTER — HOSPITAL ENCOUNTER (OUTPATIENT)
Facility: HOSPITAL | Age: 45
Setting detail: RECURRING SERIES
Discharge: HOME | End: 2025-07-17
Payer: MEDICAID

## 2025-07-14 PROCEDURE — 97161 PT EVAL LOW COMPLEX 20 MIN: CPT

## 2025-07-14 NOTE — PROGRESS NOTES
Therapeutic Procedures:  Tx Min Billable or 1:1 Min (if diff from Tx Min) Procedure, Rationale, Specifics   12  29798 Therapeutic Activity (timed):  use of dynamic activities replicating functional movements to increase ROM, strength, coordination, balance, and proprioception in order to improve patient's ability to progress to PLOF and address remaining functional goals.  (see flow sheet as applicable)    Details if applicable:     8  25113 Self Care/Home Management (timed):  improve patient knowledge and understanding of home injury/symptom/pain management, positioning, posture/ergonomics, activity modification, and joint protection strategies  to improve patient's ability to progress to PLOF and address remaining functional goals.  (see flow sheet as applicable)     Details if applicable:     28  [facility] Totals Reminder: bill using total billable min of TIMED therapeutic procedures (example: do not include dry needle or estim unattended, both untimed codes, in totals to left)  8-22 min = 1 unit; 23-37 min = 2 units; 38-52 min = 3 units; 53-67 min = 4 units; 68-82 min = 5 units   Total Total     TOTAL TREATMENT TIME:        28     [x]  Patient Education billed concurrently with other procedures   [x] Review HEP  Access Code: [email or web address]  URL: [email or web address]  Date: 07/14/2025  Prepared by: Bo    Exercises  - Standing Shoulder Horizontal Abduction with Resistance  - 1 x daily - 7 x weekly - 2 sets - 10 reps  - Scaption with Dumbbells  - 1 x daily - 7 x weekly - 2 sets - 10 reps  - Standing Full Range Shoulder Flexion with Dumbbells  - 1 x daily - 7 x weekly - 2 sets - 10 reps  - Standing Shoulder Internal Rotation Stretch with Towel  - 1 x daily - 7 x weekly - 1 sets - 10 reps - 10 seconds hold  - Shoulder Internal Rotation with Resistance  - 1 x daily - 7 x weekly - 2 sets - 10 reps  - Shoulder External Rotation Reactive Isometrics  - 1 x daily - 7 x weekly - 2 sets - 10 reps  - Shoulder

## 2025-07-18 ENCOUNTER — TELEPHONE (OUTPATIENT)
Facility: HOSPITAL | Age: 45
End: 2025-07-18

## 2025-07-23 ENCOUNTER — APPOINTMENT (OUTPATIENT)
Facility: HOSPITAL | Age: 45
End: 2025-07-23
Payer: MEDICAID

## 2025-07-30 ENCOUNTER — APPOINTMENT (OUTPATIENT)
Facility: HOSPITAL | Age: 45
End: 2025-07-30
Payer: MEDICAID